# Patient Record
Sex: MALE | Race: BLACK OR AFRICAN AMERICAN | URBAN - METROPOLITAN AREA
[De-identification: names, ages, dates, MRNs, and addresses within clinical notes are randomized per-mention and may not be internally consistent; named-entity substitution may affect disease eponyms.]

---

## 2018-08-28 ENCOUNTER — HISTORICAL (OUTPATIENT)
Dept: ADMINISTRATIVE | Facility: HOSPITAL | Age: 53
End: 2018-08-28

## 2022-04-07 ENCOUNTER — HISTORICAL (OUTPATIENT)
Dept: ADMINISTRATIVE | Facility: HOSPITAL | Age: 57
End: 2022-04-07

## 2022-04-23 VITALS
SYSTOLIC BLOOD PRESSURE: 133 MMHG | BODY MASS INDEX: 25.89 KG/M2 | HEIGHT: 71 IN | DIASTOLIC BLOOD PRESSURE: 77 MMHG | WEIGHT: 184.94 LBS

## 2022-05-02 NOTE — HISTORICAL [facility]ER
This is a historical note converted from [employer]. Formatting and pictures may have been removed.  Please reference Wilmer for original formatting and attached multimedia. Chief Complaint  F/U OA BOTH KNEES TRIED SYNVISC IN 2016. PT STATES HIS PAIN IS WORSE AND HE IS HAVING A HARD TIME SLEEPING. LEFT KNEE IS WORSE ..CV  History of Present Illness  This is a 50-year-old male with bilateral knee pain. ?He had previous knee scopes on the left knee 2014.? His left knee is more symptomatic than his right knee.? He complains of medial sided knee pain on both knees.? Hes had Synvisc injections in the past also.? He states the Synvisc injections were helpful?for only?a short amount of time.??He works as a . [1]  8/28/2018 this patient continues to have?significant bilateral knee symptoms. ?The left is worse than the right.? He has had a lot of swelling episodes of difficulty at work as a .? He works as a  at an elementary school.? He also comes in with complaints of?pain in his?scrotal?area and also?radicular symptoms in the left leg.  Review of Systems  GENERAL: No fevers, chills, unexpected weight loss or gain  ?????MUSCULOSKELETAL: Joint pain, extremity pain [2]  Physical Exam  Vitals & Measurements  HT:?180?cm? HT:?180?cm? WT:?97.52?kg? WT:?97.52?kg?  General: Well-developed, well-nourished.  Neuro: Alert and oriented x 3.  Psych: Normal mood and affect.  CV: Palpable radial pulses.  Resp: Smooth and unlabored.  Skin: No evidence of focal lesions or trauma.  Hem/Imm/Lymph: No evidence of lymphangitis or adenopathy.  Gait: No trendelenburg gait.  DTR: 2+, no hypo or hyperreflexia.  Coordination and Balance: No tremors or abnormal station.  Bilateral?Knee Exam:  Varus deformity. Range of motion from 3-120 degrees. Negative patella grind and equal subluxation of knee cap medial and lateral < 1cm. Negative patella tendon tenderness. Negative Lachman and anterior drawer test. Negative posterior  drawer test. Negative varus and valgus stress test. ?Positive?medial joint line tenderness. Negative lateral joint line tenderness. 5/5 strength and normal skin appearance. Sensibility normal. [3]  Lumbar Exam:  No deformity. Negative tenderness to palpation along the spine. No step off. 5/5 strength right lower extremity. 4/5 strength left lower extremity. ?Positive?lower tract signs. hypo-reflexes. Negative clonus. Normal skin appearance. Sensibility normal.  Assessment/Plan  1.?Osteoarthritis of both knees  ? This patient decided he wants to proceed with?bilateral unloading knee braces, medial.? We will also do?bilateral Synvisc injections.? The goal is to try to buy as much time as possible.? If this does not help then we will consider?bilateral knee replacement surgery at that time.? He actually does not have any patellofemoral arthritis in the?more than likely just?do the?femoral-tibial?aspect of the knee replacement.  Ordered:  Office/Outpatient Visit Level 4 Established [id number] PC, Osteoarthritis of both knees  Left lumbar radiculopathy  Diabetes  Obesity, [facility] - Ortho Surg , 08/28/18 10:26:00 CDT  XR Knees Bilateral Standing AP, Routine, 08/28/18 10:18:00 CDT, Pain, None, Ambulatory, Rad Type, Osteoarthritis of both knees, Not Scheduled, 08/28/18 10:18:00 CDT  ?  2.?Left lumbar radiculopathy  ? Since he has these continued lumbar symptoms, want to?get a?lumbar MRI?to rule out any?significant pathology. ?This could be a roadmap for surgery.  Ordered:  MRI Lumbar Spine W/O Contrast, Routine, 08/28/18 10:32:00 CDT, Radiculopathy lumbar / thoracic, None, Ambulatory, Rad Type, Order for future visit, Left lumbar radiculopathy, Schedule this test, [facility], 08/28/18 10:32:00 CDT  Office/Outpatient Visit Level 4 Established [id number] PC, Osteoarthritis of both knees  Left lumbar radiculopathy  Diabetes  Obesity, [facility] - Ortho Surg , 08/28/18 10:26:00  CDT  ?  3.?Diabetes  Ordered:  Office/Outpatient Visit Level 4 Established [id number] PC, Osteoarthritis of both knees  Left lumbar radiculopathy  Diabetes  Obesity, [facility] - Ortho Surg , 08/28/18 10:26:00 CDT  ?  4.?Obesity  ? I counseled this patient on meal size portion control and exercise for weight loss and how this can improve joint pain.  Ordered:  Office/Outpatient Visit Level 4 Established [id number] PC, Osteoarthritis of both knees  Left lumbar radiculopathy  Diabetes  Obesity, [facility] - Ortho Surg , 08/28/18 10:26:00 CDT  ?   Problem List/Past Medical History  Ongoing  Diabetes  Obesity  Osteoarthritis of both knees  Historical  Hypertension  Procedure/Surgical History  RIGHT KNEE STERIOD INJECTION (09/16/2016)  BILATERAL SYNVISC INJECTIONS (04/26/2016)  Meniscal repair (2015)  Appendectomy   Medications  Glipizide Er 10 Mg Tab, 30 mg= 3 tab(s), Oral, Daily  Humalog Mix Kwkpen 75/25, 25 units, Subcutaneous, BID  metoprolol succinate 25 mg oral tablet, extended release, 25 mg= 1 tab(s), Oral, Daily  Quinapril 40 Mg Tab, 40 mg= 1 tab(s), Oral, Daily  Allergies  Bactrim?(rash)  Social History  Alcohol  Never, 04/19/2016  Tobacco  Never smoker Use:., 08/28/2018  Never smoker, 04/19/2016  Family History  Family history is negative  Health Maintenance  Health Maintenance  ???Pending?(in the next year)  ??? ??OverDue  ??? ? ? ?Blood Pressure Screening due??06/16/17??and every 1??year(s)  ??? ? ? ?Hypertension Management-Blood Pressure due??06/16/17??and every 1??year(s)  ??? ? ? ?Obesity Screening due??11/22/17??and every 1??year(s)  ??? ??Due?  ??? ? ? ?Alcohol Misuse Screening due??08/28/18??and every 1??year(s)  ??? ? ? ?Aspirin Therapy for CVD Prevention due??08/28/18??and every 1??year(s)  ??? ? ? ?Colorectal Screening due??08/28/18??and every?  ??? ? ? ?Diabetes Maintenance-Eye Exam due??08/28/18??and every?  ??? ? ? ?Diabetes Maintenance-Foot Exam due??08/28/18??and every?  ??? ? ? ?Diabetes  Maintenance-HgbA1c due??08/28/18??and every?  ??? ? ? ?Diabetes Maintenance-Fasting Lipid Profile due??08/28/18??and every?  ??? ? ? ?Influenza Vaccine due??08/28/18??and every?  ??? ? ? ?Lipid Screening due??08/28/18??and every?  ??? ? ? ?Tetanus Vaccine due??08/28/18??and every 10??year(s)  ??? ??Due In Future?  ??? ? ? ?Hypertension Management-BMP not due until??07/30/19??and every 1??year(s)  ???Satisfied?(in the past 1 year)  ??? ??Satisfied?  ??? ? ? ?Blood Pressure Screening on??07/31/18.??Satisfied by Reji Messina  ??? ? ? ?Depression Screening on??08/28/18.??Satisfied by Jovanna Mireles LPN  ??? ? ? ?Diabetes Screening on??07/30/18.??Satisfied by Bibi Tam  ??? ? ? ?Hypertension Management-Blood Pressure on??07/31/18.??Satisfied by Reji Messina  ?  ?  Diagnostic Results  4 views of knee: X-rays demonstrate views of the knee joint space narrowing and degenerative changes with a varus deformity. There is also subchondral sclerosis and osteophyte formation.     [1]?Office Visit Note; Lane Thibodeaux MD 11/22/2016 11:27 CST  [2]?Office Visit Note; Lane Thibodeaux MD 11/22/2016 11:27 CST  [3]?Office Visit Note; Lane Thibodeaux MD 11/22/2016 11:27 CST